# Patient Record
(demographics unavailable — no encounter records)

---

## 2025-02-13 NOTE — PHYSICAL EXAM
[de-identified] : wax removed AU - Tms normal  [Midline] : trachea located in midline position [] : [place]-Hallpike test is negative [Normal] : no rashes

## 2025-02-13 NOTE — HISTORY OF PRESENT ILLNESS
[de-identified] : 82 year old female presents for annual follow up for SNHL. Wears b/l hearing aids daily. Hearing fluctuates, occasionally trouble hearing certain sounds. No falls or vertigo episodes since last seen. Denies otalgia, otorrhea, ear infections, tinnitus.